# Patient Record
Sex: FEMALE | URBAN - METROPOLITAN AREA
[De-identification: names, ages, dates, MRNs, and addresses within clinical notes are randomized per-mention and may not be internally consistent; named-entity substitution may affect disease eponyms.]

---

## 2021-01-03 ENCOUNTER — Encounter (OUTPATIENT)
Dept: URBAN - METROPOLITAN AREA CLINIC 48 | Facility: CLINIC | Age: 71
End: 2021-01-03
Payer: MEDICARE

## 2021-01-03 PROCEDURE — 92004 COMPRE OPH EXAM NEW PT 1/>: CPT | Performed by: OPHTHALMOLOGY

## 2021-01-03 ASSESSMENT — INTRAOCULAR PRESSURE
OD: 22
OS: 10

## 2021-01-06 ENCOUNTER — OFFICE VISIT (OUTPATIENT)
Dept: URBAN - METROPOLITAN AREA CLINIC 48 | Facility: CLINIC | Age: 71
End: 2021-01-06
Payer: MEDICARE

## 2021-01-06 ENCOUNTER — SURGERY (OUTPATIENT)
Dept: URBAN - METROPOLITAN AREA SURGERY 26 | Facility: SURGERY | Age: 71
End: 2021-01-06
Payer: MEDICARE

## 2021-01-06 DIAGNOSIS — H33.012 RETINAL DETACHMENT WITH SINGLE BREAK, LEFT EYE: Primary | ICD-10-CM

## 2021-01-06 PROCEDURE — 92134 CPTRZ OPH DX IMG PST SGM RTA: CPT | Performed by: OPHTHALMOLOGY

## 2021-01-06 PROCEDURE — 67108 REPAIR DETACHED RETINA: CPT | Performed by: OPHTHALMOLOGY

## 2021-01-06 PROCEDURE — 99204 OFFICE O/P NEW MOD 45 MIN: CPT | Performed by: OPHTHALMOLOGY

## 2021-01-06 ASSESSMENT — INTRAOCULAR PRESSURE
OD: 26
OD: 26
OS: 23
OS: 23

## 2021-01-06 NOTE — IMPRESSION/PLAN
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 Macula off Plan: OCT ordered and performed today. Discussed diagnosis with patient. The clinical exam is consistent retinal detachment. To reduce the risk of further vision loss, urgent surgical intervention is strongly recommended. Discussed treatment options, the various techniques to repair an RD discussed with patient including scleral buckle, silicone oil and pneumatic retinopexy. In addition altitude with gas bubble were discussed. Recommend sx, after a through discussion of surgical R/B/A. The patient understands the potential risks of sx, including (but not limited to) bleeding, pain, infection, loss of vision, loss of eye and possible need for more sx. The patient also understands that pre detachment visual acuity may not return.  The patient elects to proceed with sx RL-2

## 2021-01-07 ENCOUNTER — POST-OPERATIVE VISIT (OUTPATIENT)
Dept: URBAN - METROPOLITAN AREA CLINIC 48 | Facility: CLINIC | Age: 71
End: 2021-01-07
Payer: MEDICARE

## 2021-01-07 PROCEDURE — 99024 POSTOP FOLLOW-UP VISIT: CPT | Performed by: OPHTHALMOLOGY

## 2021-01-07 RX ORDER — OFLOXACIN 3 MG/ML
0.3 % SOLUTION/ DROPS OPHTHALMIC
Qty: 5 | Refills: 0 | Status: ACTIVE
Start: 2021-01-07

## 2021-01-07 RX ORDER — PREDNISOLONE ACETATE 10 MG/ML
1 % SUSPENSION/ DROPS OPHTHALMIC
Qty: 5 | Refills: 1 | Status: INACTIVE
Start: 2021-01-07 | End: 2021-02-24

## 2021-01-07 ASSESSMENT — INTRAOCULAR PRESSURE
OS: 13
OD: 17

## 2021-01-07 NOTE — IMPRESSION/PLAN
Impression: S/P 25ga PPVx/AFx/EL/C3F8 16% OS - 1 Day. Encounter for surgical aftercare following surgery on a sense organ  Z48.810. Plan: Patient to start Oflox and Pred QID OS
reiterated precautions

## 2021-01-13 ENCOUNTER — POST-OPERATIVE VISIT (OUTPATIENT)
Dept: URBAN - METROPOLITAN AREA CLINIC 48 | Facility: CLINIC | Age: 71
End: 2021-01-13
Payer: MEDICARE

## 2021-01-13 PROCEDURE — 99024 POSTOP FOLLOW-UP VISIT: CPT | Performed by: OPHTHALMOLOGY

## 2021-01-13 ASSESSMENT — INTRAOCULAR PRESSURE
OD: 23
OS: 11

## 2021-01-13 NOTE — IMPRESSION/PLAN
Impression: S/P 25ga PPVx/AFx/EL/C3F8 16% OS - 7 Days. Encounter for surgical aftercare following surgery on a sense organ  Z48.810.  Plan: All questions answered, Pt. to RTC in 1 month for Post op Continue Pred QID OS and D/C Ocuflox

## 2021-02-17 ENCOUNTER — POST-OPERATIVE VISIT (OUTPATIENT)
Dept: URBAN - METROPOLITAN AREA CLINIC 48 | Facility: CLINIC | Age: 71
End: 2021-02-17
Payer: MEDICARE

## 2021-02-17 PROCEDURE — 99024 POSTOP FOLLOW-UP VISIT: CPT | Performed by: OPHTHALMOLOGY

## 2021-02-17 ASSESSMENT — INTRAOCULAR PRESSURE
OS: 19
OD: 22

## 2021-02-17 NOTE — IMPRESSION/PLAN
Impression: S/P 25ga PPVx/AFx/EL/C3F8 16% OS - 42 Days. Encounter for surgical aftercare following surgery on a sense organ  Z48.810.  Plan: --Continue Prednisolone acetate BID OS

## 2021-03-24 ENCOUNTER — POST-OPERATIVE VISIT (OUTPATIENT)
Dept: URBAN - METROPOLITAN AREA CLINIC 48 | Facility: CLINIC | Age: 71
End: 2021-03-24
Payer: MEDICARE

## 2021-03-24 DIAGNOSIS — Z48.810 ENCOUNTER FOR SURGICAL AFTERCARE FOLLOWING SURGERY ON A SENSE ORGAN: Primary | ICD-10-CM

## 2021-03-24 PROCEDURE — 99024 POSTOP FOLLOW-UP VISIT: CPT | Performed by: OPHTHALMOLOGY

## 2021-03-24 ASSESSMENT — INTRAOCULAR PRESSURE
OD: 17
OS: 20

## 2021-03-24 NOTE — IMPRESSION/PLAN
Impression: S/P PPVx/AFx/EL/C3F8 OS - 77 Days. Encounter for surgical aftercare following surgery on a sense organ  Z48.810. Plan: No retinal contraindication to CE IOL. Discussed with patient I recommend waiting another month before having CE surgery, patient will be traveling back home. Will consult back home.  
 --Continue Prednisolone acetate BID OS until the bottle runs out then D/c